# Patient Record
Sex: FEMALE | Race: WHITE | NOT HISPANIC OR LATINO | URBAN - METROPOLITAN AREA
[De-identification: names, ages, dates, MRNs, and addresses within clinical notes are randomized per-mention and may not be internally consistent; named-entity substitution may affect disease eponyms.]

---

## 2017-08-21 ENCOUNTER — OFFICE VISIT (OUTPATIENT)
Dept: INTERNAL MEDICINE | Facility: CLINIC | Age: 37
End: 2017-08-21

## 2017-08-21 VITALS
OXYGEN SATURATION: 98 % | WEIGHT: 285.6 LBS | BODY MASS INDEX: 50.61 KG/M2 | TEMPERATURE: 96.9 F | HEART RATE: 91 BPM | SYSTOLIC BLOOD PRESSURE: 136 MMHG | DIASTOLIC BLOOD PRESSURE: 90 MMHG | HEIGHT: 63 IN

## 2017-08-21 DIAGNOSIS — Z88.9 HISTORY OF SEASONAL ALLERGIES: ICD-10-CM

## 2017-08-21 DIAGNOSIS — M54.50 LUMBOSACRAL PAIN: ICD-10-CM

## 2017-08-21 DIAGNOSIS — Z76.89 ENCOUNTER TO ESTABLISH CARE: Primary | ICD-10-CM

## 2017-08-21 DIAGNOSIS — M62.830 SPASM OF LUMBAR PARASPINOUS MUSCLE: ICD-10-CM

## 2017-08-21 DIAGNOSIS — J45.20 MILD INTERMITTENT ASTHMA WITHOUT COMPLICATION: ICD-10-CM

## 2017-08-21 PROBLEM — K14.1 GEOGRAPHIC TONGUE: Status: ACTIVE | Noted: 2017-08-21

## 2017-08-21 PROBLEM — E66.09 OBESITY DUE TO EXCESS CALORIES: Status: ACTIVE | Noted: 2017-08-21

## 2017-08-21 PROCEDURE — 99204 OFFICE O/P NEW MOD 45 MIN: CPT | Performed by: NURSE PRACTITIONER

## 2017-08-21 RX ORDER — CETIRIZINE HYDROCHLORIDE 10 MG/1
10 TABLET ORAL DAILY
COMMUNITY

## 2017-08-21 RX ORDER — IPRATROPIUM BROMIDE 21 UG/1
SPRAY, METERED NASAL
Refills: 6 | COMMUNITY
Start: 2017-05-22

## 2017-08-21 NOTE — PROGRESS NOTES
Subjective   Mary Ross is a 36 y.o. female here to establish care.  Chief Complaint   Patient presents with   • Establish Care       History of Present Illness     Back pain-  Went to  8/16/2017 and 8/18/2017 with back pain, they diagnosed lumbosacral strain,gave her medrol dose daniel which she will finish tomorrow, naproxen and flexeril. Has had significant trelief with the flexeril and naproxen.  She has been able to decrease from 3 to 2 of the flexeril a day.    Pain in low back improving, intermittent with occasional twinges of aching and tightening to right lower back that is worse with standing for long periods of time. Yesterday was able to go to the grocery store after 2 days of rest and only had some mild pains. Denies numbness, tingling, and saddle anesthesia.    Trying to get pregnant.  Had a miscarriage several years ago. Has not had a pap since college. Does not see GYN and does not wish to at this time.  She is currently not interested in further testing for infertility .   LMP 8/10/2017. -Periods regular at about every 28-32 days. First day of menses has always been painful.  No menorrhagia or  metrorrhagia. No pelvic pain.     Allergies: Has had a lot of problems with allergies since she was a child.  She has had allergy testing done.  See's an allergist and receives allergy shots; takes zyrtec daily, and feels like this works well for her.       Asthma: currently has an albuterol inhaler for PRN and takes Breo daily. Symptoms well controlled on this. Denies cough, dyspnea, wheezing, or activity limitations.  Rarely has to use the albuterol but it works well when she has to.      Health maintenance:   Influenza: never  Tdap: 1998  Pap: about 20 years ago.    The following portions of the patient's history were reviewed and updated as appropriate: allergies, current medications, past family history, past medical history, past social history, past surgical history and problem list.    Review of  "Systems   Constitutional: Negative for activity change, appetite change, fatigue, fever and unexpected weight change.   HENT: Negative for congestion, ear discharge, ear pain, postnasal drip, rhinorrhea, sinus pressure, sore throat and trouble swallowing.    Eyes: Negative for visual disturbance.   Respiratory: Negative for cough, shortness of breath and wheezing.    Cardiovascular: Negative for chest pain, palpitations and leg swelling.   Gastrointestinal: Negative for abdominal pain, constipation, diarrhea, nausea and vomiting.   Endocrine: Negative for cold intolerance and heat intolerance.   Genitourinary: Negative for difficulty urinating, frequency, menstrual problem, pelvic pain, urgency, vaginal bleeding and vaginal discharge.   Musculoskeletal: Positive for back pain. Negative for gait problem, joint swelling and myalgias.        Having Spasms in back    Skin: Negative for rash and wound.   Allergic/Immunologic: Positive for environmental allergies. Negative for immunocompromised state.   Neurological: Negative for dizziness, tremors, speech difficulty, weakness, light-headedness, numbness and headaches.   Psychiatric/Behavioral: Negative for decreased concentration. The patient is not nervous/anxious.      Blood pressure 136/90, pulse 91, temperature 96.9 °F (36.1 °C), height 62.5\" (158.8 cm), weight 285 lb 9.6 oz (130 kg), last menstrual period 08/11/2017, SpO2 98 %, not currently breastfeeding.    Allergies   Allergen Reactions   • Penicillins      Found on allergy testing     Past Medical History:   Diagnosis Date   • Allergic     takes allergy shots   • Asthma 1997   • Geographic tongue    • Miscarriage    • Wears partial dentures      Past Surgical History:   Procedure Laterality Date   • ADENOIDECTOMY     • MANDIBLE FRACTURE SURGERY  1997    jaw surgery    • WISDOM TOOTH EXTRACTION  1997     Family History   Problem Relation Age of Onset   • Arthritis Mother    • Hypertension Mother    • Arthritis " Father    • Hypertension Father    • Migraines Father    • Asthma Father    • Asthma Brother    • Suicidality Maternal Grandfather    • Stroke Maternal Grandfather      Social History     Social History   • Marital status:      Spouse name: N/A   • Number of children: N/A   • Years of education: N/A     Occupational History   •       Social History Main Topics   • Smoking status: Never Smoker   • Smokeless tobacco: Never Used   • Alcohol use Yes      Comment: occassional/social   • Drug use: No   • Sexual activity: Yes     Partners: Male     Birth control/ protection: None      Comment: trying to get pregnant     Other Topics Concern   • Not on file     Social History Narrative    Lives with  with his parents.        There is no immunization history on file for this patient.    Current Outpatient Prescriptions:   •  cetirizine (zyrTEC) 10 MG tablet, Take 10 mg by mouth Daily., Disp: , Rfl:   •  cyclobenzaprine (FLEXERIL) 10 MG tablet, Take 1 tablet by mouth 3 (Three) Times a Day As Needed for Muscle Spasms for up to 5 days., Disp: 15 tablet, Rfl: 0  •  Fluticasone Furoate-Vilanterol (BREO ELLIPTA IN), Inhale., Disp: , Rfl:   •  MethylPREDNISolone (MEDROL, HUBER,) 4 MG tablet, Take as directed on package instructions., Disp: 21 tablet, Rfl: 0  •  naproxen (NAPROSYN) 500 MG tablet, Take 1 tablet by mouth 2 (Two) Times a Day With Meals for 5 days., Disp: 10 tablet, Rfl: 0  •  ipratropium (ATROVENT) 0.03 % nasal spray, INSTILL 1 SPRAY IN EACH NOSTRIL BID, Disp: , Rfl: 6    Objective   Physical Exam   Constitutional: She is oriented to person, place, and time. She appears well-developed and well-nourished.   HENT:   Head: Normocephalic and atraumatic.   Neck: Normal range of motion. Neck supple.   Cardiovascular: Normal rate, regular rhythm and normal heart sounds.    No murmur heard.  Pulmonary/Chest: Effort normal and breath sounds normal. No respiratory distress.   Abdominal: Soft. Bowel  sounds are normal. She exhibits no distension.   Musculoskeletal:        Right hip: Normal.        Left hip: Normal.        Lumbar back: She exhibits pain and spasm. She exhibits normal range of motion, no tenderness, no bony tenderness, no swelling, no edema and no deformity.   Negative SLR and crossed SLR, tenderness to right paravertebral lumbosacral region.    Lymphadenopathy:     She has no cervical adenopathy.   Neurological: She is alert and oriented to person, place, and time. She has normal strength and normal reflexes. No cranial nerve deficit or sensory deficit.   Skin: Skin is warm and dry.   Psychiatric: She has a normal mood and affect. Her behavior is normal. Judgment and thought content normal.   Nursing note and vitals reviewed.      Assessment/Plan   Mary was seen today for establish care.    Diagnoses and all orders for this visit:    Encounter to establish care    Lumbosacral pain    Spasm of lumbar paraspinous muscle    Mild intermittent asthma without complication    History of seasonal allergies        Outpatient Encounter Prescriptions as of 8/21/2017   Medication Sig Dispense Refill   • cetirizine (zyrTEC) 10 MG tablet Take 10 mg by mouth Daily.     • cyclobenzaprine (FLEXERIL) 10 MG tablet Take 1 tablet by mouth 3 (Three) Times a Day As Needed for Muscle Spasms for up to 5 days. 15 tablet 0   • Fluticasone Furoate-Vilanterol (BREO ELLIPTA IN) Inhale.     • MethylPREDNISolone (MEDROL, HUEBR,) 4 MG tablet Take as directed on package instructions. 21 tablet 0   • naproxen (NAPROSYN) 500 MG tablet Take 1 tablet by mouth 2 (Two) Times a Day With Meals for 5 days. 10 tablet 0   • ipratropium (ATROVENT) 0.03 % nasal spray INSTILL 1 SPRAY IN EACH NOSTRIL BID  6   • [DISCONTINUED] phenylephrine-promethazine-codeine (PHENERGAN VC with CODEINE) 5-6.25-10 MG/5ML syrup syrup Take 5 mL by mouth Every 6 (Six) Hours As Needed (cough). 60 mL 0     No facility-administered encounter medications on file as  of 8/21/2017.      Continue the medrol until gone and the naproxen and flexeril as needed.  Will hold off on imaging as back has had significant improvement on meds.   Continue albuterol and Breo for asthma  No refills needed today.   Continue to see allergist for allergy shots.   RTC 1 month for PE with PAP and will update Tdap at that time. Call sooner with any problems.    Plan of care discussed with pt. She verbalized understanding and agreement.

## 2017-09-18 ENCOUNTER — OFFICE VISIT (OUTPATIENT)
Dept: INTERNAL MEDICINE | Facility: CLINIC | Age: 37
End: 2017-09-18

## 2017-09-18 VITALS
OXYGEN SATURATION: 97 % | HEIGHT: 63 IN | DIASTOLIC BLOOD PRESSURE: 88 MMHG | SYSTOLIC BLOOD PRESSURE: 134 MMHG | WEIGHT: 292.4 LBS | HEART RATE: 78 BPM | BODY MASS INDEX: 51.81 KG/M2 | TEMPERATURE: 96.9 F

## 2017-09-18 DIAGNOSIS — Z23 NEED FOR VACCINATION: ICD-10-CM

## 2017-09-18 DIAGNOSIS — Z00.00 ROUTINE GENERAL MEDICAL EXAMINATION AT A HEALTH CARE FACILITY: Primary | ICD-10-CM

## 2017-09-18 DIAGNOSIS — R53.83 FATIGUE, UNSPECIFIED TYPE: ICD-10-CM

## 2017-09-18 DIAGNOSIS — E66.01 MORBID OBESITY DUE TO EXCESS CALORIES (HCC): ICD-10-CM

## 2017-09-18 DIAGNOSIS — Z23 NEED FOR TDAP VACCINATION: ICD-10-CM

## 2017-09-18 DIAGNOSIS — Z01.419 ENCOUNTER FOR ANNUAL ROUTINE GYNECOLOGICAL EXAMINATION: ICD-10-CM

## 2017-09-18 LAB
ALBUMIN SERPL-MCNC: 3.9 G/DL (ref 3.2–4.8)
ALBUMIN/GLOB SERPL: 1.4 G/DL (ref 1.5–2.5)
ALP SERPL-CCNC: 72 U/L (ref 25–100)
ALT SERPL W P-5'-P-CCNC: 23 U/L (ref 7–40)
ANION GAP SERPL CALCULATED.3IONS-SCNC: 1 MMOL/L (ref 3–11)
ARTICHOKE IGE QN: 116 MG/DL (ref 0–130)
AST SERPL-CCNC: 19 U/L (ref 0–33)
BILIRUB BLD-MCNC: NEGATIVE MG/DL
BILIRUB SERPL-MCNC: 0.3 MG/DL (ref 0.3–1.2)
BUN BLD-MCNC: 13 MG/DL (ref 9–23)
BUN/CREAT SERPL: 16.3 (ref 7–25)
CALCIUM SPEC-SCNC: 8.8 MG/DL (ref 8.7–10.4)
CHLORIDE SERPL-SCNC: 104 MMOL/L (ref 99–109)
CHOLEST SERPL-MCNC: 185 MG/DL (ref 0–200)
CLARITY, POC: CLEAR
CO2 SERPL-SCNC: 33 MMOL/L (ref 20–31)
COLOR UR: NORMAL
CREAT BLD-MCNC: 0.8 MG/DL (ref 0.6–1.3)
DEPRECATED RDW RBC AUTO: 49.5 FL (ref 37–54)
ERYTHROCYTE [DISTWIDTH] IN BLOOD BY AUTOMATED COUNT: 15.6 % (ref 11.3–14.5)
GFR SERPL CREATININE-BSD FRML MDRD: 80 ML/MIN/1.73
GLOBULIN UR ELPH-MCNC: 2.8 GM/DL
GLUCOSE BLD-MCNC: 99 MG/DL (ref 70–100)
GLUCOSE UR STRIP-MCNC: NEGATIVE MG/DL
HCT VFR BLD AUTO: 44.8 % (ref 34.5–44)
HDLC SERPL-MCNC: 61 MG/DL (ref 40–60)
HGB BLD-MCNC: 13.9 G/DL (ref 11.5–15.5)
KETONES UR QL: NEGATIVE
LEUKOCYTE EST, POC: NEGATIVE
MCH RBC QN AUTO: 26.5 PG (ref 27–31)
MCHC RBC AUTO-ENTMCNC: 31 G/DL (ref 32–36)
MCV RBC AUTO: 85.5 FL (ref 80–99)
NITRITE UR-MCNC: NEGATIVE MG/ML
PH UR: 5.5 [PH] (ref 5–8)
PLATELET # BLD AUTO: 292 10*3/MM3 (ref 150–450)
PMV BLD AUTO: 11.9 FL (ref 6–12)
POTASSIUM BLD-SCNC: 4.4 MMOL/L (ref 3.5–5.5)
PROT SERPL-MCNC: 6.7 G/DL (ref 5.7–8.2)
PROT UR STRIP-MCNC: NEGATIVE MG/DL
RBC # BLD AUTO: 5.24 10*6/MM3 (ref 3.89–5.14)
RBC # UR STRIP: NEGATIVE /UL
SODIUM BLD-SCNC: 138 MMOL/L (ref 132–146)
SP GR UR: 1.02 (ref 1–1.03)
TRIGL SERPL-MCNC: 63 MG/DL (ref 0–150)
TSH SERPL DL<=0.05 MIU/L-ACNC: 3.62 MIU/ML (ref 0.35–5.35)
UROBILINOGEN UR QL: NORMAL
WBC NRBC COR # BLD: 7.49 10*3/MM3 (ref 3.5–10.8)

## 2017-09-18 PROCEDURE — 90715 TDAP VACCINE 7 YRS/> IM: CPT | Performed by: NURSE PRACTITIONER

## 2017-09-18 PROCEDURE — 81003 URINALYSIS AUTO W/O SCOPE: CPT | Performed by: NURSE PRACTITIONER

## 2017-09-18 PROCEDURE — 36415 COLL VENOUS BLD VENIPUNCTURE: CPT | Performed by: NURSE PRACTITIONER

## 2017-09-18 PROCEDURE — 90686 IIV4 VACC NO PRSV 0.5 ML IM: CPT | Performed by: NURSE PRACTITIONER

## 2017-09-18 PROCEDURE — 84443 ASSAY THYROID STIM HORMONE: CPT | Performed by: NURSE PRACTITIONER

## 2017-09-18 PROCEDURE — 80053 COMPREHEN METABOLIC PANEL: CPT | Performed by: NURSE PRACTITIONER

## 2017-09-18 PROCEDURE — 90472 IMMUNIZATION ADMIN EACH ADD: CPT | Performed by: NURSE PRACTITIONER

## 2017-09-18 PROCEDURE — 80061 LIPID PANEL: CPT | Performed by: NURSE PRACTITIONER

## 2017-09-18 PROCEDURE — 85027 COMPLETE CBC AUTOMATED: CPT | Performed by: NURSE PRACTITIONER

## 2017-09-18 PROCEDURE — 90471 IMMUNIZATION ADMIN: CPT | Performed by: NURSE PRACTITIONER

## 2017-09-18 PROCEDURE — 99395 PREV VISIT EST AGE 18-39: CPT | Performed by: NURSE PRACTITIONER

## 2017-09-18 NOTE — PROGRESS NOTES
Patient Care Team:  MAYKEL Rivero as PCP - General (Nurse Practitioner)     Chief complaint: Patient is in today for a physical          Patient is a 37 y.o. female who presents for her yearly physical exam.     HPI     Back pain- resolved        Allergies: Has had a lot of problems with allergies since she was a child.  She has had allergy testing done.  See's an allergist (at [facility]) and receives allergy shots; takes zyrtec daily, and feels like this works well for her.        Asthma: currently has an albuterol inhaler for PRN and takes Breo daily (prescribed by her allergist). Symptoms well controlled on this. Denies cough, dyspnea, wheezing, or activity limitations. Has not had to use the albuterol since her last visit with me.       Trying to get pregnant.  Had a miscarriage several years ago. Has not had a pap since college. Does not see GYN and does not wish to at this time.  She is currently not interested in further testing for infertility . LMP 9/8/2017. -Periods regular at about every 28-32 days. First day of menses has always been painful with cramps.  No menorrhagia or  metrorrhagia. No pelvic pain. Takes Pamprin or ibuprofen and this works well.       Health maintenance:   Influenza: will update today  Tdap: 1998- will update today  Pap: about 20 years ago- updated today  Tobacco use: denies    Eye exam: 2015  Dental exam: 2014    Diet:  Does not eat until late at night around 730 pm.  Has gained 7 lbs since she was here last.  Feels like she is eating less.   Her father in law does the cooking and does things like meat and potatoes.     Exercise: Has been doing some walking outside while the kids are play.        Review of Systems   Constitutional: Positive for fatigue (mild). Negative for activity change, appetite change, diaphoresis, fever and unexpected weight change.   HENT: Negative for congestion, ear discharge, ear pain, postnasal drip, rhinorrhea, sinus pressure, sore  throat and trouble swallowing.    Eyes: Negative for visual disturbance.   Respiratory: Negative for apnea, cough, chest tightness, shortness of breath and wheezing.    Cardiovascular: Negative for chest pain, palpitations and leg swelling.   Gastrointestinal: Negative for abdominal distention, abdominal pain, blood in stool, constipation, diarrhea, nausea and vomiting.   Endocrine: Negative for cold intolerance, heat intolerance, polydipsia, polyphagia and polyuria.   Genitourinary: Negative for difficulty urinating, dysuria, flank pain, frequency, menstrual problem, pelvic pain, urgency, vaginal bleeding and vaginal discharge.   Musculoskeletal: Negative for back pain, gait problem, joint swelling and myalgias.   Skin: Negative for color change, rash and wound.   Allergic/Immunologic: Positive for environmental allergies. Negative for immunocompromised state.   Neurological: Negative for dizziness, tremors, speech difficulty, weakness, light-headedness, numbness and headaches.   Psychiatric/Behavioral: Negative for agitation, confusion, decreased concentration, dysphoric mood, self-injury, sleep disturbance and suicidal ideas. The patient is not nervous/anxious.          History  Past Medical History:   Diagnosis Date   • Allergic     takes allergy shots   • Asthma 1997   • Geographic tongue    • Miscarriage    • Wears partial dentures       Past Surgical History:   Procedure Laterality Date   • ADENOIDECTOMY     • MANDIBLE FRACTURE SURGERY  1997    jaw surgery    • WISDOM TOOTH EXTRACTION  1997      Allergies   Allergen Reactions   • Penicillins      Found on allergy testing      Family History   Problem Relation Age of Onset   • Arthritis Mother    • Hypertension Mother    • Arthritis Father    • Hypertension Father    • Migraines Father    • Asthma Father    • Asthma Brother    • Suicidality Maternal Grandfather    • Stroke Maternal Grandfather      Social History     Social History   • Marital status:   "    Spouse name: N/A   • Number of children: N/A   • Years of education: N/A     Occupational History   •       Social History Main Topics   • Smoking status: Never Smoker   • Smokeless tobacco: Never Used   • Alcohol use Yes      Comment: occassional/social   • Drug use: No   • Sexual activity: Yes     Partners: Male     Birth control/ protection: None      Comment: trying to get pregnant     Other Topics Concern   • Not on file     Social History Narrative    Lives with  with his parents.         Current Outpatient Prescriptions:   •  cetirizine (zyrTEC) 10 MG tablet, Take 10 mg by mouth Daily., Disp: , Rfl:   •  Fluticasone Furoate-Vilanterol (BREO ELLIPTA IN), Inhale., Disp: , Rfl:   •  ipratropium (ATROVENT) 0.03 % nasal spray, INSTILL 1 SPRAY IN EACH NOSTRIL BID, Disp: , Rfl: 6   Immunization History   Administered Date(s) Administered   • Flu Vaccine Quad PF >36MO 09/18/2017   • Tdap 09/18/2017                   /88  Pulse 78  Temp 96.9 °F (36.1 °C)  Ht 62.5\" (158.8 cm)  Wt 292 lb 6.4 oz (133 kg)  LMP 09/08/2017 (Exact Date)  SpO2 97%  Breastfeeding? No  BMI 52.63 kg/m2      Physical Exam   Constitutional: She is oriented to person, place, and time. Vital signs are normal. She appears well-developed and well-nourished. No distress.   HENT:   Head: Normocephalic and atraumatic.   Right Ear: Hearing, tympanic membrane, external ear and ear canal normal.   Left Ear: Hearing, tympanic membrane, external ear and ear canal normal.   Nose: Nose normal. Right sinus exhibits no maxillary sinus tenderness and no frontal sinus tenderness. Left sinus exhibits no maxillary sinus tenderness and no frontal sinus tenderness.   Mouth/Throat: Uvula is midline, oropharynx is clear and moist and mucous membranes are normal.   Geographic tongue     Eyes: Conjunctivae, EOM and lids are normal. Pupils are equal, round, and reactive to light.   Neck: Trachea normal and normal range of motion. " Neck supple. Normal carotid pulses and no JVD present. Carotid bruit is not present. No thyromegaly present.   Cardiovascular: Normal rate, regular rhythm and normal heart sounds.  PMI is not displaced.    No murmur heard.  Pulses:       Carotid pulses are 2+ on the right side, and 2+ on the left side.       Radial pulses are 2+ on the right side, and 2+ on the left side.        Femoral pulses are 2+ on the right side, and 2+ on the left side.       Popliteal pulses are 2+ on the right side, and 2+ on the left side.        Dorsalis pedis pulses are 2+ on the right side, and 2+ on the left side.        Posterior tibial pulses are 2+ on the right side, and 2+ on the left side.   Pulmonary/Chest: Effort normal and breath sounds normal. No respiratory distress. Chest wall is not dull to percussion. She exhibits no mass, no bony tenderness, no deformity and no retraction. Right breast exhibits no inverted nipple, no mass, no nipple discharge, no skin change and no tenderness. Left breast exhibits no inverted nipple, no mass, no nipple discharge, no skin change and no tenderness.   Abdominal: Soft. Bowel sounds are normal. She exhibits no distension. There is no hepatosplenomegaly. There is no tenderness. There is no CVA tenderness. No hernia.   Rotund abdomen     Genitourinary: Vagina normal and uterus normal. Pelvic exam was performed with patient supine. There is no rash, tenderness, lesion or injury on the right labia. There is no rash, tenderness, lesion or injury on the left labia. Cervix exhibits no motion tenderness, no discharge and no friability. Right adnexum displays no mass, no tenderness and no fullness. Left adnexum displays no mass, no tenderness and no fullness.   Musculoskeletal: Normal range of motion.   Lymphadenopathy:        Head (right side): No submental, no submandibular, no tonsillar, no preauricular, no posterior auricular and no occipital adenopathy present.        Head (left side): No  submental, no submandibular, no tonsillar, no preauricular, no posterior auricular and no occipital adenopathy present.     She has no cervical adenopathy.     She has no axillary adenopathy.        Right: No supraclavicular adenopathy present.        Left: No supraclavicular adenopathy present.   Neurological: She is alert and oriented to person, place, and time. She has normal strength and normal reflexes. No cranial nerve deficit or sensory deficit. Coordination normal.   Skin: Skin is warm and dry.   Psychiatric: She has a normal mood and affect. Her speech is normal and behavior is normal. Judgment and thought content normal. She is attentive.   Nursing note and vitals reviewed.                Diagnoses and all orders for this visit:    Routine general medical examination at a health care facility  -     Tdap Vaccine Greater Than or Equal To 8yo IM  -     Comprehensive Metabolic Panel  -     Lipid Panel  -     TSH  -     CBC (No Diff)    Encounter for annual routine gynecological examination  -     Liquid-based Pap Smear, Screening  -     POCT urinalysis dipstick, automated    Need for vaccination  -     Flu Vaccine Quad PF 3YR+ (FLUARIX/FLUZONE [id number])    Need for Tdap vaccination  -     Tdap Vaccine Greater Than or Equal To 8yo IM    Morbid obesity due to excess calories  -     Comprehensive Metabolic Panel  -     Lipid Panel    Adult BMI 50.0-59.9 kg/sq m  -     Comprehensive Metabolic Panel  -     Lipid Panel    Fatigue, unspecified type  -     TSH  -     CBC (No Diff)      Labs Immunizations and screenings: She will schedule Eye and  dental exams, flu and Tdap updated today  Counseling: *diet and exercise for weight loss; discussed calorie and exercise tracking with Runteq Chad.  Increasing exercise to walking 20-30 min at a time for 4-5 days a week. Not ready to check into bariatric surgery or meds    Follow up: 3 months or sooner with any problems.  She may be moving to [facility] soon,  encouraged to establish with a new PCP ASAP if she does.   Plan of care discussed with pt. They verbalized understanding and agreement.     Aixa Moralez, APRN   9/18/2017   12:32 PM

## 2017-09-19 ENCOUNTER — TELEPHONE (OUTPATIENT)
Dept: INTERNAL MEDICINE | Facility: CLINIC | Age: 37
End: 2017-09-19

## 2017-09-19 NOTE — TELEPHONE ENCOUNTER
----- Message from MAYKEL Rivero sent at 9/19/2017  8:15 AM EDT -----  Please let pt know that her labs are ok except her CBC shows a slight elevation in her red blood cells and some of the indices are out of range.  We should probably repeat a CBC in a month and go ahead and get a sleep study now as she may have undiagnosed sleep apnea.  Would she be ok with this?  Pap not back yet